# Patient Record
Sex: FEMALE | Race: WHITE | URBAN - METROPOLITAN AREA
[De-identification: names, ages, dates, MRNs, and addresses within clinical notes are randomized per-mention and may not be internally consistent; named-entity substitution may affect disease eponyms.]

---

## 2024-01-18 ENCOUNTER — TELEPHONE (OUTPATIENT)
Age: 64
End: 2024-01-18

## 2024-01-18 NOTE — TELEPHONE ENCOUNTER
Received [employer] Teams message from Sherly Barker (PS[facility]) alerting me that Ms. Simental canceled her consult scheduled for 1/22 due to receiving a billing estimate for $290.00).    I called Ms. Simental and left a message explaining that genetic consults are not billed at this time, and requested that she call back [phone] for more information or if she would like to reschedule her appointment.